# Patient Record
Sex: FEMALE | Race: WHITE | NOT HISPANIC OR LATINO
[De-identification: names, ages, dates, MRNs, and addresses within clinical notes are randomized per-mention and may not be internally consistent; named-entity substitution may affect disease eponyms.]

---

## 2022-11-10 ENCOUNTER — NON-APPOINTMENT (OUTPATIENT)
Age: 62
End: 2022-11-10

## 2022-11-10 ENCOUNTER — APPOINTMENT (OUTPATIENT)
Dept: HEART AND VASCULAR | Facility: CLINIC | Age: 62
End: 2022-11-10

## 2022-11-10 DIAGNOSIS — Z80.49 FAMILY HISTORY OF MALIGNANT NEOPLASM OF OTHER GENITAL ORGANS: ICD-10-CM

## 2022-11-10 DIAGNOSIS — R00.2 PALPITATIONS: ICD-10-CM

## 2022-11-10 DIAGNOSIS — Z87.891 PERSONAL HISTORY OF NICOTINE DEPENDENCE: ICD-10-CM

## 2022-11-10 DIAGNOSIS — Z82.49 FAMILY HISTORY OF ISCHEMIC HEART DISEASE AND OTHER DISEASES OF THE CIRCULATORY SYSTEM: ICD-10-CM

## 2022-11-10 DIAGNOSIS — K20.0 EOSINOPHILIC ESOPHAGITIS: ICD-10-CM

## 2022-11-10 DIAGNOSIS — Z80.0 FAMILY HISTORY OF MALIGNANT NEOPLASM OF DIGESTIVE ORGANS: ICD-10-CM

## 2022-11-10 DIAGNOSIS — Z86.69 PERSONAL HISTORY OF OTHER DISEASES OF THE NERVOUS SYSTEM AND SENSE ORGANS: ICD-10-CM

## 2022-11-10 DIAGNOSIS — D56.3 THALASSEMIA MINOR: ICD-10-CM

## 2022-11-10 DIAGNOSIS — Z00.00 ENCOUNTER FOR GENERAL ADULT MEDICAL EXAMINATION W/OUT ABNORMAL FINDINGS: ICD-10-CM

## 2022-11-10 DIAGNOSIS — C81.90 HODGKIN LYMPHOMA, UNSPECIFIED, UNSPECIFIED SITE: ICD-10-CM

## 2022-11-10 DIAGNOSIS — R06.09 OTHER FORMS OF DYSPNEA: ICD-10-CM

## 2022-11-10 DIAGNOSIS — Z80.41 FAMILY HISTORY OF MALIGNANT NEOPLASM OF OVARY: ICD-10-CM

## 2022-11-10 DIAGNOSIS — Z78.9 OTHER SPECIFIED HEALTH STATUS: ICD-10-CM

## 2022-11-10 DIAGNOSIS — Z81.8 FAMILY HISTORY OF OTHER MENTAL AND BEHAVIORAL DISORDERS: ICD-10-CM

## 2022-11-10 DIAGNOSIS — K21.9 GASTRO-ESOPHAGEAL REFLUX DISEASE W/OUT ESOPHAGITIS: ICD-10-CM

## 2022-11-10 DIAGNOSIS — E66.3 OVERWEIGHT: ICD-10-CM

## 2022-11-10 PROCEDURE — 99205 OFFICE O/P NEW HI 60 MIN: CPT

## 2022-11-10 PROCEDURE — 93000 ELECTROCARDIOGRAM COMPLETE: CPT

## 2022-11-10 PROCEDURE — 99072 ADDL SUPL MATRL&STAF TM PHE: CPT

## 2022-11-12 ENCOUNTER — NON-APPOINTMENT (OUTPATIENT)
Age: 62
End: 2022-11-12

## 2022-11-12 PROBLEM — Z86.69 HISTORY OF MIGRAINE HEADACHES: Status: RESOLVED | Noted: 2022-11-10 | Resolved: 2022-11-12

## 2022-11-12 PROBLEM — R00.2 PALPITATIONS: Status: ACTIVE | Noted: 2022-11-12

## 2022-11-12 PROBLEM — D56.3 THALASSEMIA TRAIT: Status: RESOLVED | Noted: 2022-11-10 | Resolved: 2022-11-12

## 2022-11-12 PROBLEM — R06.09 DYSPNEA ON EXERTION: Status: ACTIVE | Noted: 2022-11-12

## 2022-11-12 LAB
ALBUMIN SERPL ELPH-MCNC: 4.8 G/DL
ALP BLD-CCNC: 93 U/L
ALT SERPL-CCNC: 22 U/L
ANION GAP SERPL CALC-SCNC: 12 MMOL/L
AST SERPL-CCNC: 27 U/L
BASOPHILS # BLD AUTO: 0.09 K/UL
BASOPHILS NFR BLD AUTO: 0.8 %
BILIRUB SERPL-MCNC: 0.4 MG/DL
BUN SERPL-MCNC: 10 MG/DL
CALCIUM SERPL-MCNC: 10.5 MG/DL
CHLORIDE SERPL-SCNC: 102 MMOL/L
CHOLEST SERPL-MCNC: 195 MG/DL
CO2 SERPL-SCNC: 28 MMOL/L
CREAT SERPL-MCNC: 0.79 MG/DL
CRP SERPL HS-MCNC: 2.91 MG/L
EGFR: 86 ML/MIN/1.73M2
EOSINOPHIL # BLD AUTO: 0.07 K/UL
EOSINOPHIL NFR BLD AUTO: 0.6 %
ESTIMATED AVERAGE GLUCOSE: 128 MG/DL
FERRITIN SERPL-MCNC: 130 NG/ML
GLUCOSE SERPL-MCNC: 84 MG/DL
HBA1C MFR BLD HPLC: 6.1 %
HCT VFR BLD CALC: 47.9 %
HDLC SERPL-MCNC: 50 MG/DL
HGB BLD-MCNC: 14.1 G/DL
IMM GRANULOCYTES NFR BLD AUTO: 0.3 %
IRON SATN MFR SERPL: 20 %
IRON SERPL-MCNC: 88 UG/DL
LDLC SERPL CALC-MCNC: 130 MG/DL
LYMPHOCYTES # BLD AUTO: 3.79 K/UL
LYMPHOCYTES NFR BLD AUTO: 32.3 %
MAN DIFF?: NORMAL
MCHC RBC-ENTMCNC: 21.2 PG
MCHC RBC-ENTMCNC: 29.4 GM/DL
MCV RBC AUTO: 72 FL
MONOCYTES # BLD AUTO: 0.83 K/UL
MONOCYTES NFR BLD AUTO: 7.1 %
NEUTROPHILS # BLD AUTO: 6.9 K/UL
NEUTROPHILS NFR BLD AUTO: 58.9 %
NONHDLC SERPL-MCNC: 146 MG/DL
PLATELET # BLD AUTO: 361 K/UL
POTASSIUM SERPL-SCNC: 4.8 MMOL/L
PROT SERPL-MCNC: 8 G/DL
RBC # BLD: 6.65 M/UL
RBC # FLD: 18.4 %
SODIUM SERPL-SCNC: 142 MMOL/L
T3 SERPL-MCNC: 85 NG/DL
T3FREE SERPL-MCNC: 2.29 PG/ML
T4 SERPL-MCNC: 6.6 UG/DL
TIBC SERPL-MCNC: 445 UG/DL
TRIGL SERPL-MCNC: 77 MG/DL
TSH SERPL-ACNC: 3.39 UIU/ML
UIBC SERPL-MCNC: 357 UG/DL
WBC # FLD AUTO: 11.72 K/UL

## 2022-11-12 RX ORDER — UBIDECARENONE 200 MG
200 CAPSULE ORAL
Refills: 0 | Status: ACTIVE | COMMUNITY

## 2022-11-12 RX ORDER — FAMOTIDINE 40 MG/1
40 TABLET, FILM COATED ORAL
Refills: 0 | Status: ACTIVE | COMMUNITY

## 2022-11-12 RX ORDER — IBUPROFEN 200 MG
600 CAPSULE ORAL
Refills: 0 | Status: ACTIVE | COMMUNITY

## 2022-11-12 RX ORDER — MULTIVIT-MIN/IRON/FOLIC ACID/K 18-600-40
CAPSULE ORAL
Refills: 0 | Status: ACTIVE | COMMUNITY

## 2022-11-12 NOTE — HISTORY OF PRESENT ILLNESS
[FreeTextEntry1] : Micaela Walden is a 61 yo female who presents for CV evaluation.  She has been followed by Dr Gerardo De Jesus in the past.\par \par She denies cp, sob at rest, pnd, orthopnea, edema, or loc.  She has occ fleeting palps.  She has experienced positional lightheadedness in the past.  She noted decreased endurance.\par \par She is active.  She is compliant with meds.\par \par ECG today reveals NSR.\par \par Clinical hx reviewed in detail.\par \par Recommendations:\par 1. exercise\par 2. continue current meds\par 3. blood work - should consider lipid lowering strategies\par 4. EXSE\par 5. CPET\par 6. carotid duplex

## 2022-11-12 NOTE — DISCUSSION/SUMMARY
[Coronary Artery Disease] : coronary artery disease [Weight Reduction] : weight reduction [Hyperlipidemia] : hyperlipidemia [Diet Modification] : diet modification [Exercise] : exercise [Hypertension] : hypertension [Exercise Regimen] : an exercise regimen [Dietary Modification] : dietary modification [Weight Loss] : weight loss [Stable] : stable [None] : none [Low Sodium Diet] : low sodium diet [Patient] : the patient [de-identified] : dec endurance; MARTINEZ [EKG obtained to assist in diagnosis and management of assessed problem(s)] : EKG obtained to assist in diagnosis and management of assessed problem(s)

## 2022-11-12 NOTE — REASON FOR VISIT
[Symptom and Test Evaluation] : symptom and test evaluation [Hyperlipidemia] : hyperlipidemia [Hypertension] : hypertension [Coronary Artery Disease] : coronary artery disease [FreeTextEntry3] : Nellie Britton

## 2022-11-16 ENCOUNTER — APPOINTMENT (OUTPATIENT)
Dept: HEART AND VASCULAR | Facility: CLINIC | Age: 62
End: 2022-11-16

## 2022-11-16 PROCEDURE — 99072 ADDL SUPL MATRL&STAF TM PHE: CPT

## 2022-11-16 PROCEDURE — 94010 BREATHING CAPACITY TEST: CPT | Mod: 59

## 2022-11-16 PROCEDURE — 94621 CARDIOPULM EXERCISE TESTING: CPT

## 2022-11-17 ENCOUNTER — TRANSCRIPTION ENCOUNTER (OUTPATIENT)
Age: 62
End: 2022-11-17

## 2022-11-17 ENCOUNTER — NON-APPOINTMENT (OUTPATIENT)
Age: 62
End: 2022-11-17

## 2022-11-25 ENCOUNTER — NON-APPOINTMENT (OUTPATIENT)
Age: 62
End: 2022-11-25

## 2022-12-14 ENCOUNTER — APPOINTMENT (OUTPATIENT)
Dept: HEART AND VASCULAR | Facility: CLINIC | Age: 62
End: 2022-12-14
Payer: COMMERCIAL

## 2022-12-14 VITALS
OXYGEN SATURATION: 97 % | HEIGHT: 63 IN | BODY MASS INDEX: 26.58 KG/M2 | SYSTOLIC BLOOD PRESSURE: 126 MMHG | HEART RATE: 69 BPM | WEIGHT: 150 LBS | DIASTOLIC BLOOD PRESSURE: 60 MMHG

## 2022-12-14 PROCEDURE — 93015 CV STRESS TEST SUPVJ I&R: CPT

## 2022-12-14 PROCEDURE — 99072 ADDL SUPL MATRL&STAF TM PHE: CPT

## 2022-12-14 PROCEDURE — 93306 TTE W/DOPPLER COMPLETE: CPT

## 2022-12-14 PROCEDURE — 93880 EXTRACRANIAL BILAT STUDY: CPT

## 2022-12-16 ENCOUNTER — NON-APPOINTMENT (OUTPATIENT)
Age: 62
End: 2022-12-16

## 2022-12-16 DIAGNOSIS — Z88.8 ALLERGY STATUS TO OTHER DRUGS, MEDICAMENTS AND BIOLOGICAL SUBSTANCES: ICD-10-CM

## 2022-12-21 ENCOUNTER — NON-APPOINTMENT (OUTPATIENT)
Age: 62
End: 2022-12-21

## 2023-01-11 ENCOUNTER — RESULT REVIEW (OUTPATIENT)
Age: 63
End: 2023-01-11

## 2023-01-24 ENCOUNTER — RESULT REVIEW (OUTPATIENT)
Age: 63
End: 2023-01-24

## 2023-01-24 ENCOUNTER — NON-APPOINTMENT (OUTPATIENT)
Age: 63
End: 2023-01-24

## 2023-01-26 ENCOUNTER — APPOINTMENT (OUTPATIENT)
Dept: HEART AND VASCULAR | Facility: CLINIC | Age: 63
End: 2023-01-26

## 2023-01-30 ENCOUNTER — NON-APPOINTMENT (OUTPATIENT)
Age: 63
End: 2023-01-30

## 2023-03-29 ENCOUNTER — APPOINTMENT (OUTPATIENT)
Dept: HEART AND VASCULAR | Facility: CLINIC | Age: 63
End: 2023-03-29
Payer: SELF-PAY

## 2023-03-29 DIAGNOSIS — I25.5 ISCHEMIC CARDIOMYOPATHY: ICD-10-CM

## 2023-03-29 DIAGNOSIS — R94.39 ABNORMAL RESULT OF OTHER CARDIOVASCULAR FUNCTION STUDY: ICD-10-CM

## 2023-03-29 PROCEDURE — 99215 OFFICE O/P EST HI 40 MIN: CPT | Mod: 95

## 2023-03-29 RX ORDER — METHYLPREDNISOLONE 32 MG/1
32 TABLET ORAL
Qty: 2 | Refills: 1 | Status: DISCONTINUED | COMMUNITY
Start: 2022-12-16 | End: 2023-03-29

## 2023-03-29 RX ORDER — METOPROLOL SUCCINATE 50 MG/1
50 TABLET, EXTENDED RELEASE ORAL
Qty: 2 | Refills: 0 | Status: DISCONTINUED | COMMUNITY
Start: 2022-12-16 | End: 2023-03-29

## 2023-03-31 PROBLEM — I25.5 ISCHEMIC MYOCARDIAL DYSFUNCTION: Status: ACTIVE | Noted: 2022-12-16

## 2023-03-31 PROBLEM — R94.39 ABNORMAL STRESS ECG: Status: ACTIVE | Noted: 2022-12-16

## 2023-03-31 RX ORDER — ASPIRIN 81 MG
81 TABLET, DELAYED RELEASE (ENTERIC COATED) ORAL
Refills: 0 | Status: ACTIVE | COMMUNITY

## 2023-03-31 RX ORDER — LISINOPRIL 2.5 MG/1
2.5 TABLET ORAL
Refills: 0 | Status: ACTIVE | COMMUNITY

## 2023-03-31 NOTE — HISTORY OF PRESENT ILLNESS
[FreeTextEntry1] : Micaela Walden requests televideo follow up.  Consent obtained and recorded.  She is at her home and doctor is in [place], [place].  \par \par She has been followed by Dr Gerardo De Jesus in the past.\par \par She denies cp, sob at rest, pnd, orthopnea, edema, palp, or loc.  \par \par She is active.  She is compliant with meds.\par \par CPET 11/2022: ischemic myocardial dysfxn; .91% predicted peak VO2\par Carotid Duplex 12/2022: min disease MORENO and mild disease LICA\par EXSE 12/2022: nl lv sys fxn; nl wallace fxn; mild AI; 9:20 min Conrado; pos ECG; no ischemia by WM\par CTA 1/2023: min LAD (0.88 CTFFR), nl small CX (0.89 CTFFR); mild to mod mixed RCA (0.94 CT FFR)\par \par Clinical hx and results reviewed in detail.\par \par PE from 11/2022 eval.\par \par Recommendations:\par 1. exercise\par 2. start aspirin 81 mg daily; change pravastatin 40 mg daily to rosuvastatin 20 mg daily; t/c additional lipid lowering therapies if not at goal on rosuvastatin; continue lisinopril 2.5 mg daily\par 3. Mediterranean diet\par 4. f/u in 3 months for blood work and review

## 2023-03-31 NOTE — DISCUSSION/SUMMARY
[Coronary Artery Disease] : coronary artery disease [Weight Reduction] : weight reduction [Hyperlipidemia] : hyperlipidemia [Not Responding to Treatment] : not responding to treatment [Medication Changes Per Orders] : Medication changes are as documented in orders [Diet Modification] : diet modification [Exercise] : exercise [Hypertension] : hypertension [Exercise Regimen] : an exercise regimen [Dietary Modification] : dietary modification [Weight Loss] : weight loss [Low Sodium Diet] : low sodium diet [Stable] : stable [None] : There are no changes in medication management [Exercise Rehab] : exercise rehabilitation [Patient] : the patient [de-identified] : mild to mod mixed RCA lesion (0.94 CT FFR), min LAD lesion (0.88 CT FFR), and nl small CX (0.89 CT FRR)  by CTA 1/2023; abnl stress ECG 12/2022; ischemic myocardial dysfxn 11/2022 [de-identified] : min MORENO and mild LICA disease b/l 12/2022 [FreeTextEntry1] : AI - mild

## 2023-03-31 NOTE — REASON FOR VISIT
[Symptom and Test Evaluation] : symptom and test evaluation [Structural Heart and Valve Disease] : structural heart and valve disease [Hyperlipidemia] : hyperlipidemia [Hypertension] : hypertension [Coronary Artery Disease] : coronary artery disease [Carotid, Aortic and Peripheral Vascular Disease] : carotid, aortic and peripheral vascular disease [FreeTextEntry3] : Nellie Britton

## 2023-08-01 ENCOUNTER — APPOINTMENT (OUTPATIENT)
Dept: HEART AND VASCULAR | Facility: CLINIC | Age: 63
End: 2023-08-01
Payer: SELF-PAY

## 2023-08-01 DIAGNOSIS — I65.29 OCCLUSION AND STENOSIS OF UNSPECIFIED CAROTID ARTERY: ICD-10-CM

## 2023-08-01 DIAGNOSIS — I35.1 NONRHEUMATIC AORTIC (VALVE) INSUFFICIENCY: ICD-10-CM

## 2023-08-01 DIAGNOSIS — R73.03 PREDIABETES.: ICD-10-CM

## 2023-08-01 DIAGNOSIS — I10 ESSENTIAL (PRIMARY) HYPERTENSION: ICD-10-CM

## 2023-08-01 PROCEDURE — 99215 OFFICE O/P EST HI 40 MIN: CPT | Mod: 95

## 2023-08-01 NOTE — REASON FOR VISIT
[Structural Heart and Valve Disease] : structural heart and valve disease [Hyperlipidemia] : hyperlipidemia [Hypertension] : hypertension [Coronary Artery Disease] : coronary artery disease [Carotid, Aortic and Peripheral Vascular Disease] : carotid, aortic and peripheral vascular disease [FreeTextEntry3] : Nellie Britton

## 2023-08-01 NOTE — DISCUSSION/SUMMARY
[Coronary Artery Disease] : coronary artery disease [Weight Reduction] : weight reduction [Hyperlipidemia] : hyperlipidemia [Not Responding to Treatment] : not responding to treatment [Diet Modification] : diet modification [Exercise] : exercise [Hypertension] : hypertension [Exercise Regimen] : an exercise regimen [Dietary Modification] : dietary modification [Weight Loss] : weight loss [Low Sodium Diet] : low sodium diet [Stable] : stable [None] : There are no changes in medication management [Exercise Rehab] : exercise rehabilitation [Patient] : the patient [de-identified] : mild to mod mixed RCA lesion (0.94 CT FFR), min LAD lesion (0.88 CT FFR), and nl small CX (0.89 CT FRR)  by CTA 1/2023; abnl stress ECG 12/2022; ischemic myocardial dysfxn 11/2022 [de-identified] : min MORENO and mild LICA disease b/l 12/2022 [FreeTextEntry1] : AI - mild

## 2023-08-01 NOTE — REVIEW OF SYSTEMS
[Joint Pain] : no joint pain [Joint Swelling] : no joint swelling [Joint Stiffness] : no joint stiffness [Muscle Cramps] : muscle cramps [Myalgia] : no myalgia [Negative] : Heme/Lymph

## 2023-08-14 ENCOUNTER — APPOINTMENT (OUTPATIENT)
Dept: BREAST CENTER | Facility: CLINIC | Age: 63
End: 2023-08-14
Payer: COMMERCIAL

## 2023-08-14 ENCOUNTER — NON-APPOINTMENT (OUTPATIENT)
Age: 63
End: 2023-08-14

## 2023-08-14 VITALS
BODY MASS INDEX: 28.34 KG/M2 | HEART RATE: 86 BPM | OXYGEN SATURATION: 99 % | DIASTOLIC BLOOD PRESSURE: 69 MMHG | HEIGHT: 62 IN | SYSTOLIC BLOOD PRESSURE: 127 MMHG | WEIGHT: 154 LBS

## 2023-08-14 DIAGNOSIS — Z92.3 PERSONAL HISTORY OF IRRADIATION: ICD-10-CM

## 2023-08-14 DIAGNOSIS — Z85.71 PERSONAL HISTORY OF HODGKIN LYMPHOMA: ICD-10-CM

## 2023-08-14 DIAGNOSIS — R92.8 OTHER ABNORMAL AND INCONCLUSIVE FINDINGS ON DIAGNOSTIC IMAGING OF BREAST: ICD-10-CM

## 2023-08-14 PROCEDURE — 99203 OFFICE O/P NEW LOW 30 MIN: CPT

## 2023-08-14 NOTE — HISTORY OF PRESENT ILLNESS
[FreeTextEntry1] : The patient is a 62-year-old  postmenopausal white female of [place] descent.  She underwent menarche at age 15 and had her first child at age 37.  She never took any hormone replacement therapy after menopause.  She quit smoking greater than 30 years ago.  She has no family history of breast or ovarian cancer but she has a maternal grandmother who had colon cancer at age 90 and a paternal cousin had colon cancer at age 67.  A maternal aunt had uterine cancer at age 75.  The patient herself was diagnosed with Hodgkin's lymphoma after a left cervical lymph node biopsy in .  She underwent mantle radiation and has been followed by Dr. Hung.  She also has a history of some hypertension and hypercholesterolemia.  She has been getting routine mammography and ultrasound but has not yet been getting screening yearly MRIs.  She underwent her last mammography and ultrasound on 2023 which showed a new nodule in the left breast 12:00 region on mammography which corresponded to a 2.5 cm cystic density on ultrasound.  She comes in now for a surgical evaluation.

## 2023-08-14 NOTE — PHYSICAL EXAM
[Normocephalic] : normocephalic [Atraumatic] : atraumatic [EOMI] : extra ocular movement intact [Supple] : supple [No Supraclavicular Adenopathy] : no supraclavicular adenopathy [No Cervical Adenopathy] : no cervical adenopathy [Examined in the supine and seated position] : examined in the supine and seated position [No dominant masses] : no dominant masses in right breast  [No dominant masses] : no dominant masses left breast [No Nipple Retraction] : no left nipple retraction [No Nipple Discharge] : no left nipple discharge [Breast Mass Right Breast ___cm] : no masses [Breast Mass Left Breast ___cm] : no masses [Breast Nipple Inversion] : nipples not inverted [Breast Nipple Retraction] : nipples not retracted [Breast Nipple Flattening] : nipples not flattened [Breast Nipple Fissures] : nipples not fissured [Breast Abnormal Lactation (Galactorrhea)] : no galactorrhea [Breast Abnormal Secretion Bloody Fluid] : no bloody discharge [Breast Abnormal Secretion Serous Fluid] : no serous discharge [Breast Abnormal Secretion Opalescent Fluid] : no milky discharge [No Axillary Lymphadenopathy] : no left axillary lymphadenopathy [No Edema] : no edema [No Rashes] : no rashes [No Ulceration] : no ulceration [de-identified] : On exam, the patient has moderately ptotic C-cup breasts.  On palpation, I cannot feel any suspicious densities in either breast even with close attention to the left breast 12:00 region.  She has no axillary, supraclavicular, or cervical adenopathy.

## 2023-08-14 NOTE — ASSESSMENT
[FreeTextEntry1] : The patient is a 62-year-old  postmenopausal white female of [place] descent.  She underwent menarche at age 15 and had her first child at age 37.  She never took any hormone replacement therapy after menopause.  She quit smoking greater than 30 years ago.  She has no family history of breast or ovarian cancer but she has a maternal grandmother who had colon cancer at age 90 and a paternal cousin had colon cancer at age 67.  A maternal aunt had uterine cancer at age 75.  The patient herself was diagnosed with Hodgkin's lymphoma after a left cervical lymph node biopsy in .  She underwent mantle radiation and has been followed by Dr. Hung.  She also has a history of some hypertension and hypercholesterolemia.  She has been getting routine mammography and ultrasound but has not yet been getting screening yearly MRIs.  She underwent her last mammography and ultrasound on 2023 which showed a new nodule in the left breast 12:00 region on mammography which corresponded to a 2.5 cm cystic density on ultrasound.  I reviewed her imaging on the PACS system and indeed she has a small cystic density seen in the left breast 12:00 region which appears benign and I agree with a follow-up diagnostic mammography and ultrasound in 6 months.  I spoke to the patient at length regarding her Hodgkin's lymphoma and her increased risk of breast cancer due to her mantle radiation.  She has not been getting screening MRIs and I would like her to get one now and she was given a prescription and will get preapproval and have this done here at [facility].  As long as that is negative she should follow up with me in 6 months and should get her follow-up diagnostic left breast mammography and ultrasound in 2023.  She will then need routine yearly mammography and ultrasound as well as MRI staggered every 6 months.  She understands her increased risk of breast cancer and the need for this close surveillance and agrees to proceed as planned.  She will follow-up with Dr. Hung as well for routine follow-up given her history of the Hodgkin's lymphoma.

## 2023-08-14 NOTE — REASON FOR VISIT
[Initial Evaluation] : an initial evaluation [FreeTextEntry1] : The patient is a postmenopausal white female of [place] descent with a family history of colon cancer and a personal history of Hodgkin's lymphoma diagnosed in 1993 for which she underwent mantle radiation and has been followed by Dr. Hung.  She comes in now to establish breast screening and surveillance with a new left breast 12:00 cystic nodule which was seen on mammography and ultrasound in June 2023.

## 2023-08-25 DIAGNOSIS — E78.5 HYPERLIPIDEMIA, UNSPECIFIED: ICD-10-CM

## 2023-08-25 DIAGNOSIS — I25.10 ATHEROSCLEROTIC HEART DISEASE OF NATIVE CORONARY ARTERY W/OUT ANGINA PECTORIS: ICD-10-CM

## 2023-08-31 ENCOUNTER — APPOINTMENT (OUTPATIENT)
Dept: HEART AND VASCULAR | Facility: CLINIC | Age: 63
End: 2023-08-31

## 2023-10-30 ENCOUNTER — RESULT REVIEW (OUTPATIENT)
Age: 63
End: 2023-10-30

## 2023-11-02 ENCOUNTER — NON-APPOINTMENT (OUTPATIENT)
Age: 63
End: 2023-11-02

## 2023-11-19 ENCOUNTER — RESULT REVIEW (OUTPATIENT)
Age: 63
End: 2023-11-19

## 2023-11-22 ENCOUNTER — NON-APPOINTMENT (OUTPATIENT)
Age: 63
End: 2023-11-22

## 2024-01-09 ENCOUNTER — APPOINTMENT (OUTPATIENT)
Dept: HEART AND VASCULAR | Facility: CLINIC | Age: 64
End: 2024-01-09
Payer: COMMERCIAL

## 2024-01-09 VITALS
HEIGHT: 62 IN | BODY MASS INDEX: 29.26 KG/M2 | WEIGHT: 159 LBS | OXYGEN SATURATION: 97 % | DIASTOLIC BLOOD PRESSURE: 58 MMHG | HEART RATE: 76 BPM | SYSTOLIC BLOOD PRESSURE: 102 MMHG

## 2024-01-09 PROCEDURE — 93306 TTE W/DOPPLER COMPLETE: CPT

## 2024-01-09 PROCEDURE — 93015 CV STRESS TEST SUPVJ I&R: CPT

## 2024-01-11 ENCOUNTER — NON-APPOINTMENT (OUTPATIENT)
Age: 64
End: 2024-01-11

## 2024-01-29 DIAGNOSIS — Z12.39 ENCOUNTER FOR OTHER SCREENING FOR MALIGNANT NEOPLASM OF BREAST: ICD-10-CM

## 2024-02-14 ENCOUNTER — APPOINTMENT (OUTPATIENT)
Dept: HEART AND VASCULAR | Facility: CLINIC | Age: 64
End: 2024-02-14

## 2024-02-21 ENCOUNTER — APPOINTMENT (OUTPATIENT)
Dept: HEART AND VASCULAR | Facility: CLINIC | Age: 64
End: 2024-02-21

## 2024-02-21 NOTE — HISTORY OF PRESENT ILLNESS
[FreeTextEntry1] :  I, Gino Foster, acted solely as a scribe for Dr. Bonner on this date on 02/21/2024. [FreeTextEntry1] : Micaela Walden requests televideo follow up.  Consent obtained and recorded.  She is at her home and doctor is in [place], [place].    She has been followed by Dr Gerardo De Jesus in the past.  She denies cp, sob at rest, pnd, orthopnea, edema, palp, or loc.    She is active.  She is compliant with meds.  CPET 11/2022: ischemic myocardial dysfxn; .91% predicted peak VO2 Carotid Duplex 12/2022: min disease MORENO and mild disease LICA EXSE 12/2022: nl lv sys fxn; nl wallace fxn; mild AI; 9:20 min Conrado; pos ECG; no ischemia by WM CTA 1/2023: min LAD (0.88 CTFFR), nl small CX (0.89 CTFFR); mild to mod mixed RCA (0.94 CT FFR)  Clinical hx reviewed in detail.  PE from 11/2022 eval.  Recommendations: 1. exercise 2. inc po hydration to address cramps; t/c other interventions prior to consideration of statin intolerance 2. resume aspirin 81 mg daily; continue rosuvastatin 20 mg daily; t/c additional or alternate lipid lowering therapy if not at goal at next blood work; continue lisinopril 2.5 mg daily 3. Mediterranean diet 4. f/u in 2-3 months for update on cramping 5. EXSE/CPET 1/2024

## 2024-03-18 ENCOUNTER — RX RENEWAL (OUTPATIENT)
Age: 64
End: 2024-03-18

## 2024-03-18 RX ORDER — ROSUVASTATIN CALCIUM 20 MG/1
20 TABLET, FILM COATED ORAL
Qty: 90 | Refills: 3 | Status: ACTIVE | COMMUNITY
Start: 2023-03-31 | End: 1900-01-01

## 2024-03-19 ENCOUNTER — APPOINTMENT (OUTPATIENT)
Dept: HEART AND VASCULAR | Facility: CLINIC | Age: 64
End: 2024-03-19

## 2024-05-02 ENCOUNTER — NON-APPOINTMENT (OUTPATIENT)
Age: 64
End: 2024-05-02

## 2024-05-02 ENCOUNTER — LABORATORY RESULT (OUTPATIENT)
Age: 64
End: 2024-05-02

## 2024-05-02 ENCOUNTER — APPOINTMENT (OUTPATIENT)
Dept: HEART AND VASCULAR | Facility: CLINIC | Age: 64
End: 2024-05-02
Payer: COMMERCIAL

## 2024-05-02 VITALS
HEIGHT: 62 IN | OXYGEN SATURATION: 97 % | BODY MASS INDEX: 30.18 KG/M2 | SYSTOLIC BLOOD PRESSURE: 120 MMHG | DIASTOLIC BLOOD PRESSURE: 70 MMHG | WEIGHT: 164 LBS | HEART RATE: 87 BPM | TEMPERATURE: 98.7 F

## 2024-05-02 PROCEDURE — 93000 ELECTROCARDIOGRAM COMPLETE: CPT

## 2024-05-02 PROCEDURE — 99214 OFFICE O/P EST MOD 30 MIN: CPT | Mod: 25

## 2024-05-02 RX ORDER — METHYLPREDNISOLONE 32 MG/1
32 TABLET ORAL
Qty: 2 | Refills: 0 | Status: DISCONTINUED | COMMUNITY
Start: 2023-08-14 | End: 2024-05-02

## 2024-05-07 ENCOUNTER — NON-APPOINTMENT (OUTPATIENT)
Age: 64
End: 2024-05-07

## 2024-05-07 LAB
ALBUMIN SERPL ELPH-MCNC: 4.7 G/DL
ALP BLD-CCNC: 82 U/L
ALT SERPL-CCNC: 26 U/L
ANION GAP SERPL CALC-SCNC: 16 MMOL/L
APO LP(A) SERPL-MCNC: 53 NMOL/L
AST SERPL-CCNC: 27 U/L
BASOPHILS # BLD AUTO: 0.14 K/UL
BASOPHILS NFR BLD AUTO: 1.3 %
BILIRUB SERPL-MCNC: 0.5 MG/DL
BUN SERPL-MCNC: 17 MG/DL
CALCIUM SERPL-MCNC: 9.7 MG/DL
CHLORIDE SERPL-SCNC: 103 MMOL/L
CHOLEST SERPL-MCNC: 157 MG/DL
CK SERPL-CCNC: 88 U/L
CO2 SERPL-SCNC: 22 MMOL/L
CREAT SERPL-MCNC: 0.96 MG/DL
CRP SERPL HS-MCNC: 2.15 MG/L
EGFR: 67 ML/MIN/1.73M2
EOSINOPHIL # BLD AUTO: 0.1 K/UL
EOSINOPHIL NFR BLD AUTO: 0.9 %
FERRITIN SERPL-MCNC: 115 NG/ML
FOLATE SERPL-MCNC: 14.4 NG/ML
GLUCOSE SERPL-MCNC: 83 MG/DL
HCT VFR BLD CALC: 47.2 %
HDLC SERPL-MCNC: 55 MG/DL
HGB BLD-MCNC: 14.4 G/DL
IMM GRANULOCYTES NFR BLD AUTO: 0.3 %
IRON SATN MFR SERPL: 25 %
IRON SERPL-MCNC: 105 UG/DL
LDLC SERPL CALC-MCNC: 83 MG/DL
LYMPHOCYTES # BLD AUTO: 3.43 K/UL
LYMPHOCYTES NFR BLD AUTO: 32.2 %
MAN DIFF?: NORMAL
MCHC RBC-ENTMCNC: 21.4 PG
MCHC RBC-ENTMCNC: 30.5 GM/DL
MCV RBC AUTO: 70 FL
MONOCYTES # BLD AUTO: 0.84 K/UL
MONOCYTES NFR BLD AUTO: 7.9 %
NEUTROPHILS # BLD AUTO: 6.12 K/UL
NEUTROPHILS NFR BLD AUTO: 57.4 %
NONHDLC SERPL-MCNC: 102 MG/DL
PLATELET # BLD AUTO: 317 K/UL
POTASSIUM SERPL-SCNC: 5.2 MMOL/L
PROT SERPL-MCNC: 7.3 G/DL
RBC # BLD: 6.74 M/UL
RBC # FLD: 19.5 %
SODIUM SERPL-SCNC: 140 MMOL/L
T3FREE SERPL-MCNC: 3.01 PG/ML
T3RU NFR SERPL: 1.2 TBI
T4 SERPL-MCNC: 7 UG/DL
TIBC SERPL-MCNC: 425 UG/DL
TRIGL SERPL-MCNC: 104 MG/DL
TSH SERPL-ACNC: 3.58 UIU/ML
UIBC SERPL-MCNC: 320 UG/DL
VIT B12 SERPL-MCNC: 464 PG/ML
WBC # FLD AUTO: 10.66 K/UL

## 2024-05-19 NOTE — REASON FOR VISIT
[Structural Heart and Valve Disease] : structural heart and valve disease [Hyperlipidemia] : hyperlipidemia [Hypertension] : hypertension [Coronary Artery Disease] : coronary artery disease [Carotid, Aortic and Peripheral Vascular Disease] : carotid, aortic and peripheral vascular disease [FreeTextEntry3] : Nellie Britton [FreeTextEntry1] : Follow up cardiac care

## 2024-05-19 NOTE — HISTORY OF PRESENT ILLNESS
[FreeTextEntry1] : 64 yo female with h/o thalassemia trait, CAD (non-obs on CTA), HTN, HLD, Hodgkin's lymphoma, mantle cell radiation, here for follow up cardiac care. She has been followed by Dr Gerardo De Jesus and Dr. John Cali in the past. Last seen in 08/2023 Denies any significant chest discomfort, palpitations, dyspnea, LE edema, PND or syncope. Intermittent lightheadedness/dizziness when she suddenty stands up - not new however. She denies cp, sob at rest, pnd, orthopnea, edema, palp, or loc.   She is active.  She is compliant with meds. +FMH of ASCVD - father - may have had a heart attack  Cardiac Workup: CPET 11/2022: ischemic myocardial dysfxn; .91% predicted peak VO2 Carotid Duplex 12/2022: min disease MORENO and mild disease LICA EXSE 12/2022: nl lv sys fxn; nl wallace fxn; mild AI; 9:20 min Conrado; pos ECG; no ischemia by WM CTA 1/2023: min LAD (0.88 CTFFR), nl small CX (0.89 CTFFR); mild to mod mixed RCA (0.94 CT FFR) TTE 1/2024: normal LV size/function. LVEF 67%, normal RV size/function, normal aorta size. fibrocalcific AV, no AS, Mild AI, [place] EST 1/2024: ECG is positive for ischemia. 1 mm horizontal ST depressions noted in II, III, aVF, V4-V6. Ex time: 8 min 44 sec (10.3 METS), 101% MPHR achieved.

## 2024-05-19 NOTE — REVIEW OF SYSTEMS
[Muscle Cramps] : muscle cramps [Negative] : Heme/Lymph [Joint Pain] : no joint pain [Joint Swelling] : no joint swelling [Joint Stiffness] : no joint stiffness [Myalgia] : no myalgia [FreeTextEntry5] : as per HPI

## 2024-05-19 NOTE — DISCUSSION/SUMMARY
[Coronary Artery Disease] : coronary artery disease [Weight Reduction] : weight reduction [Hyperlipidemia] : hyperlipidemia [Not Responding to Treatment] : not responding to treatment [Diet Modification] : diet modification [Exercise] : exercise [Hypertension] : hypertension [Exercise Regimen] : an exercise regimen [Dietary Modification] : dietary modification [Weight Loss] : weight loss [Low Sodium Diet] : low sodium diet [Stable] : stable [None] : There are no changes in medication management [Exercise Rehab] : exercise rehabilitation [Patient] : the patient [de-identified] : mild to mod mixed RCA lesion (0.94 CT FFR), min LAD lesion (0.88 CT FFR), and nl small CX (0.89 CT FRR)  by CTA 1/2023; abnl stress ECG 12/2022; ischemic myocardial dysfxn 11/2022 [de-identified] : min MORENO and mild LICA disease b/l 12/2022 [FreeTextEntry1] : AI - mild

## 2024-05-19 NOTE — ASSESSMENT
[FreeTextEntry1] : 64 yo female with h/o thalassemia trait, CAD (non-obs on CTA), HTN, HLD, Hodgkin's lymphoma, mantle cell radiation, here for follow up cardiac care. #ASCVD - mild CAD on CCTA, non-obstructive - continue ASA - continue crestor - continued risk factor modification; cardiovascular risk factors discussed - stress test - abnormal EKG response (also seen in 2022); will obtain stress test with imaging (stress echo vs. nuclear) if symptoms change   # Hypertension -  BP is WNL today - continue current anti-hypertensives - home BP monitoring encouraged - continued exercise as tolerated - Low salt diet  # Hyperlipidemia  - continue crestor; uptitrate dose to goal LDL <70 - Continue current prescribed medications - Low fat low cholesterol diet - Heart healthy diet emphasized - Exercise as tolerated

## 2024-06-03 ENCOUNTER — NON-APPOINTMENT (OUTPATIENT)
Age: 64
End: 2024-06-03

## 2024-06-19 NOTE — ASSESSMENT
[FreeTextEntry1] : The patient is a 63-year-old  postmenopausal white female of [place] descent.  She underwent menarche at age 15 and had her first child at age 37.  She never took any hormone replacement therapy after menopause.  She quit smoking greater than 30 years ago.  She has no family history of breast or ovarian cancer but she has a maternal grandmother who had colon cancer at age 90 and a paternal cousin had colon cancer at age 67.  A maternal aunt had uterine cancer at age 75.  The patient herself was diagnosed with Hodgkin's lymphoma after a left cervical lymph node biopsy in .  She underwent mantle radiation and has been followed by Dr. Hung.  She also has a history of some hypertension and hypercholesterolemia.  She has been getting routine mammography and ultrasound but had not yet been getting screening yearly MRIs. She underwent a bilateral mammography and ultrasound on 2023 which showed a new nodule in the left breast 12:00 region on mammography which corresponded to a 2.5 cm cystic density on ultrasound.  She then underwent a screening bilateral breast MRI at [facility] on 2023 showing some enhancement in the left breast 12:00 region 5 cm from the nipple with a 6 mm circumscribed density corresponding to the mammographic finding.  A tomosynthesis guided left breast 12:00 subareolar stereotactic core biopsy was performed on 2023 at [facility] and final pathology just showed benign conglomerate of apocrine cysts.  She underwent her last bilateral mammography and ultrasound which was reviewed from ?????? 2024 and performed at [facility] which showed ???????. On exam today, I cannot feel any suspicious densities in either breast.  The patient was reassured and should continue routine 6-month clinical exams.  Her next bilateral MRI will be due again in ??????? 2024 and she was given a prescription.  She should continue her yearly mammography and ultrasounds in ?????? 2025.  She will follow-up with Dr. Hung as well for routine follow-up given her history of the Hodgkin's lymphoma and mantle radiation.

## 2024-06-19 NOTE — HISTORY OF PRESENT ILLNESS
[FreeTextEntry1] : The patient is a 63-year-old  postmenopausal white female of [place] descent.  She underwent menarche at age 15 and had her first child at age 37.  She never took any hormone replacement therapy after menopause.  She quit smoking greater than 30 years ago.  She has no family history of breast or ovarian cancer but she has a maternal grandmother who had colon cancer at age 90 and a paternal cousin had colon cancer at age 67.  A maternal aunt had uterine cancer at age 75.  The patient herself was diagnosed with Hodgkin's lymphoma after a left cervical lymph node biopsy in .  She underwent mantle radiation and has been followed by Dr. Hung.  She also has a history of some hypertension and hypercholesterolemia.  She has been getting routine mammography and ultrasound but had not yet been getting screening yearly MRIs.  She underwent a bilateral mammography and ultrasound on 2023 which showed a new nodule in the left breast 12:00 region on mammography which corresponded to a 2.5 cm cystic density on ultrasound.  She then underwent a screening bilateral breast MRI at [facility] on 2023 showing some enhancement in the left breast 12:00 region 5 cm from the nipple with a 6 mm circumscribed density corresponding to the mammographic finding.  A tomosynthesis guided left breast 12:00 subareolar stereotactic core biopsy was performed on 2023 at [facility] and final pathology just showed benign conglomerate of apocrine cysts.  She comes in now to continue routine close breast cancer screening/surveillance due to her history of Hodgkin's lymphoma and mantle radiation.

## 2024-06-19 NOTE — REASON FOR VISIT
[Follow-Up: _____] : a [unfilled] follow-up visit [FreeTextEntry1] : The patient is a postmenopausal white female of [place] descent with a family history of colon cancer and a personal history of Hodgkin's lymphoma diagnosed in 1993 for which she underwent mantle radiation and has been followed by Dr. Hung.  She comes in now for routine high risk breast cancer screening and surveillance with a new left breast 12:00 cystic nodule which was seen on mammography and ultrasound in June 2023.  MRI performed in October 2023 did show some enhancement in the left breast 12:00 region 5 cm from the nipple correlating with the mammographic mass and tomosynthesis guided needle core biopsy was performed at [facility] on November 20, 2023 just showing benign apocrine cysts.  She comes in now for routine follow-up to get close breast cancer screening surveillance due to her history of Hodgkin's lymphoma and mantle radiation.

## 2024-06-19 NOTE — PHYSICAL EXAM
[Normocephalic] : normocephalic [Atraumatic] : atraumatic [EOMI] : extra ocular movement intact [Supple] : supple [No Supraclavicular Adenopathy] : no supraclavicular adenopathy [No Cervical Adenopathy] : no cervical adenopathy [Examined in the supine and seated position] : examined in the supine and seated position [No dominant masses] : no dominant masses in right breast  [No dominant masses] : no dominant masses left breast [No Nipple Retraction] : no left nipple retraction [No Nipple Discharge] : no left nipple discharge [Breast Mass Right Breast ___cm] : no masses [Breast Mass Left Breast ___cm] : no masses [No Axillary Lymphadenopathy] : no left axillary lymphadenopathy [No Edema] : no edema [No Rashes] : no rashes [No Ulceration] : no ulceration [Breast Nipple Inversion] : nipples not inverted [Breast Nipple Retraction] : nipples not retracted [Breast Nipple Flattening] : nipples not flattened [Breast Nipple Fissures] : nipples not fissured [Breast Abnormal Lactation (Galactorrhea)] : no galactorrhea [Breast Abnormal Secretion Bloody Fluid] : no bloody discharge [Breast Abnormal Secretion Serous Fluid] : no serous discharge [Breast Abnormal Secretion Opalescent Fluid] : no milky discharge [de-identified] : On exam, the patient has moderately ptotic C-cup breasts.  On palpation, I cannot feel any suspicious densities in either breast even with close attention to the left breast 12:00 region.  She has no axillary, supraclavicular, or cervical adenopathy.

## 2024-06-21 ENCOUNTER — NON-APPOINTMENT (OUTPATIENT)
Age: 64
End: 2024-06-21

## 2024-07-28 PROBLEM — Z85.71 HISTORY OF HODGKIN'S LYMPHOMA: Status: RESOLVED | Noted: 2023-08-14 | Resolved: 2024-07-28

## 2024-07-31 ENCOUNTER — RESULT REVIEW (OUTPATIENT)
Age: 64
End: 2024-07-31

## 2024-08-01 ENCOUNTER — APPOINTMENT (OUTPATIENT)
Dept: BREAST CENTER | Facility: CLINIC | Age: 64
End: 2024-08-01
Payer: COMMERCIAL

## 2024-08-01 VITALS
OXYGEN SATURATION: 100 % | HEART RATE: 75 BPM | WEIGHT: 160 LBS | HEIGHT: 62 IN | SYSTOLIC BLOOD PRESSURE: 136 MMHG | DIASTOLIC BLOOD PRESSURE: 73 MMHG | BODY MASS INDEX: 29.44 KG/M2

## 2024-08-01 DIAGNOSIS — Z92.3 PERSONAL HISTORY OF IRRADIATION: ICD-10-CM

## 2024-08-01 DIAGNOSIS — Z12.39 ENCOUNTER FOR OTHER SCREENING FOR MALIGNANT NEOPLASM OF BREAST: ICD-10-CM

## 2024-08-01 PROCEDURE — 99213 OFFICE O/P EST LOW 20 MIN: CPT

## 2024-08-01 NOTE — PHYSICAL EXAM
[Normocephalic] : normocephalic [Atraumatic] : atraumatic [EOMI] : extra ocular movement intact [Supple] : supple [No Supraclavicular Adenopathy] : no supraclavicular adenopathy [No Cervical Adenopathy] : no cervical adenopathy [Examined in the supine and seated position] : examined in the supine and seated position [No dominant masses] : no dominant masses in right breast  [No dominant masses] : no dominant masses left breast [No Nipple Retraction] : no left nipple retraction [No Nipple Discharge] : no left nipple discharge [Breast Mass Right Breast ___cm] : no masses [Breast Mass Left Breast ___cm] : no masses [No Axillary Lymphadenopathy] : no left axillary lymphadenopathy [No Edema] : no edema [No Rashes] : no rashes [No Ulceration] : no ulceration [Breast Nipple Inversion] : nipples not inverted [Breast Nipple Retraction] : nipples not retracted [Breast Nipple Flattening] : nipples not flattened [Breast Abnormal Lactation (Galactorrhea)] : no galactorrhea [Breast Nipple Fissures] : nipples not fissured [Breast Abnormal Secretion Bloody Fluid] : no bloody discharge [Breast Abnormal Secretion Serous Fluid] : no serous discharge [Breast Abnormal Secretion Opalescent Fluid] : no milky discharge [de-identified] : On exam, the patient has moderately ptotic C-cup breasts.  On palpation, I cannot feel any suspicious densities in either breast even with close attention to the left breast 12:00 region.  She has no axillary, supraclavicular, or cervical adenopathy.

## 2024-08-01 NOTE — ADDENDUM
[FreeTextEntry1] : I spent greater than 75% of consultation in face-to-face counseling and coordination care in this patient with a history of Hodgkin's lymphoma and mantle radiation who comes in for high risk breast cancer screening/surveillance.

## 2024-08-01 NOTE — HISTORY OF PRESENT ILLNESS
[FreeTextEntry1] : The patient is a 63-year-old  postmenopausal white female of [place] descent.  She underwent menarche at age 15 and had her first child at age 37.  She never took any hormone replacement therapy after menopause.  She quit smoking greater than 30 years ago.  She has no family history of breast or ovarian cancer but she has a maternal grandmother who had colon cancer at age 90 and a paternal cousin had colon cancer at age 67.  A maternal aunt had uterine cancer at age 75.  The patient herself was diagnosed with Hodgkin's lymphoma after a left cervical lymph node biopsy in .  She underwent mantle radiation and has been followed by Dr. Hung.  She also has a history of some hypertension and hypercholesterolemia.  She has been getting routine mammography and ultrasound but had not yet been getting screening yearly MRIs.  She underwent a bilateral mammography and ultrasound on 2023 which showed a new nodule in the left breast 12:00 region on mammography which corresponded to a 2.5 cm cystic density on ultrasound.  She then underwent a screening bilateral breast MRI at [facility] on 2023 showing some enhancement in the left breast 12:00 region 5 cm from the nipple with a 6 mm circumscribed density corresponding to the mammographic finding.  A tomosynthesis guided left breast 12:00 subareolar stereotactic core biopsy was performed on 2023 at [facility] and final pathology just showed a benign conglomerate of apocrine cysts.  She comes in now to continue routine close breast cancer screening/surveillance due to her history of Hodgkin's lymphoma and mantle radiation.

## 2024-08-01 NOTE — REASON FOR VISIT
[Follow-Up: _____] : a [unfilled] follow-up visit [FreeTextEntry1] : The patient is a postmenopausal white female of [place] descent with a family history of colon cancer and a personal history of Hodgkin's lymphoma diagnosed in 1993 for which she underwent mantle radiation and has been followed by Dr. Hung.  She comes in now for routine high risk breast cancer screening and surveillance with a new left breast 12:00 cystic nodule which was seen on mammography and ultrasound in June 2023.  MRI performed in October 2023 did show some enhancement in the left breast 12:00 region 5 cm from the nipple correlating with the mammographic mass and tomosynthesis guided needle core biopsy was performed at [facility] on November 20, 2023 just showing benign apocrine cysts.  She comes in now for routine follow-up to get close breast cancer screening surveillance due to her history of Hodgkin's lymphoma and mantle radiation.

## 2024-08-01 NOTE — ASSESSMENT
[FreeTextEntry1] : The patient is a 63-year-old  postmenopausal white female of [place] descent.  She underwent menarche at age 15 and had her first child at age 37.  She never took any hormone replacement therapy after menopause.  She quit smoking greater than 30 years ago.  She has no family history of breast or ovarian cancer but she has a maternal grandmother who had colon cancer at age 90 and a paternal cousin had colon cancer at age 67.  A maternal aunt had uterine cancer at age 75.  The patient herself was diagnosed with Hodgkin's lymphoma after a left cervical lymph node biopsy in .  She underwent mantle radiation and has been followed by Dr. Hung.  She also has a history of some hypertension and hypercholesterolemia.  She has been getting routine mammography and ultrasound but had not yet been getting screening yearly MRIs. She underwent a bilateral mammography and ultrasound on 2023 which showed a new nodule in the left breast 12:00 region on mammography which corresponded to a 2.5 cm cystic density on ultrasound.  She then underwent a screening bilateral breast MRI at [facility] on 2023 showing some enhancement in the left breast 12:00 region 5 cm from the nipple with a 6 mm circumscribed density corresponding to the mammographic finding.  A tomosynthesis guided left breast 12:00 subareolar stereotactic core biopsy was performed on 2023 at [facility] and final pathology just showed benign conglomerate of apocrine cysts.  She underwent her last bilateral mammography and ultrasound which was reviewed from today on 2024 and performed at [facility] which showed no suspicious findings. On exam today, I cannot feel any suspicious densities in either breast.  The patient was reassured and should continue routine 6-month clinical exams but I can see her yearly as long as she is getting her gynecologist or Dr. Hung to stagger her exams to get 6-month follow-up..  Her next bilateral MRI was due again in 2024 however she would like to delay it out till 2025 and I have no problem with this and she was given a prescription.  She should continue her yearly mammography and ultrasounds in 2025.  She will follow-up with Dr. Hung as well for routine follow-up given her history of the Hodgkin's lymphoma and mantle radiation.

## 2024-08-01 NOTE — PHYSICAL EXAM
[Normocephalic] : normocephalic [Atraumatic] : atraumatic [EOMI] : extra ocular movement intact [Supple] : supple [No Supraclavicular Adenopathy] : no supraclavicular adenopathy [No Cervical Adenopathy] : no cervical adenopathy [Examined in the supine and seated position] : examined in the supine and seated position [No dominant masses] : no dominant masses in right breast  [No dominant masses] : no dominant masses left breast [No Nipple Retraction] : no left nipple retraction [No Nipple Discharge] : no left nipple discharge [Breast Mass Right Breast ___cm] : no masses [Breast Mass Left Breast ___cm] : no masses [No Axillary Lymphadenopathy] : no left axillary lymphadenopathy [No Edema] : no edema [No Rashes] : no rashes [No Ulceration] : no ulceration [Breast Nipple Inversion] : nipples not inverted [Breast Nipple Retraction] : nipples not retracted [Breast Nipple Flattening] : nipples not flattened [Breast Nipple Fissures] : nipples not fissured [Breast Abnormal Lactation (Galactorrhea)] : no galactorrhea [Breast Abnormal Secretion Bloody Fluid] : no bloody discharge [Breast Abnormal Secretion Serous Fluid] : no serous discharge [Breast Abnormal Secretion Opalescent Fluid] : no milky discharge [de-identified] : On exam, the patient has moderately ptotic C-cup breasts.  On palpation, I cannot feel any suspicious densities in either breast even with close attention to the left breast 12:00 region.  She has no axillary, supraclavicular, or cervical adenopathy.

## 2024-08-01 NOTE — ASSESSMENT
[FreeTextEntry1] : The patient is a 63-year-old  postmenopausal white female of [place] descent.  She underwent menarche at age 15 and had her first child at age 37.  She never took any hormone replacement therapy after menopause.  She quit smoking greater than 30 years ago.  She has no family history of breast or ovarian cancer but she has a maternal grandmother who had colon cancer at age 90 and a paternal cousin had colon cancer at age 67.  A maternal aunt had uterine cancer at age 75.  The patient herself was diagnosed with Hodgkin's lymphoma after a left cervical lymph node biopsy in .  She underwent mantle radiation and has been followed by Dr. Hung.  She also has a history of some hypertension and hypercholesterolemia.  She has been getting routine mammography and ultrasound but had not yet been getting screening yearly MRIs. She underwent a bilateral mammography and ultrasound on 2023 which showed a new nodule in the left breast 12:00 region on mammography which corresponded to a 2.5 cm cystic density on ultrasound.  She then underwent a screening bilateral breast MRI at [facility] on 2023 showing some enhancement in the left breast 12:00 region 5 cm from the nipple with a 6 mm circumscribed density corresponding to the mammographic finding.  A tomosynthesis guided left breast 12:00 subareolar stereotactic core biopsy was performed on 2023 at [facility] and final pathology just showed benign conglomerate of apocrine cysts.  She underwent her last bilateral mammography and ultrasound which was reviewed from today on 2024 and performed at [facility] which showed no suspicious findings. On exam today, I cannot feel any suspicious densities in either breast.  The patient was reassured and should continue routine 6-month clinical exams but I can see her yearly as long as she is getting her gynecologist or Dr. Hung to stagger her exams to get 6-month follow-up..  Her next bilateral MRI was due again in 2024 however she would like to delay it out till 2025 and I have no problem with this and she was given a prescription.  She should continue her yearly mammography and ultrasounds in 2025.  She will follow-up with Dr. Hung as well for routine follow-up given her history of the Hodgkin's lymphoma and mantle radiation.

## 2024-08-01 NOTE — REASON FOR VISIT
[Follow-Up: _____] : a [unfilled] follow-up visit [FreeTextEntry1] : The patient is a postmenopausal white female of [place] descent with a family history of colon cancer and a personal history of Hodgkin's lymphoma diagnosed in 1993 for which she underwent mantle radiation and has been followed by Dr. Hung.  She comes in now for routine high risk breast cancer screening and surveillance with a new left breast 12:00 cystic nodule which was seen on mammography and ultrasound in June 2023.  MRI performed in October 2023 did show some enhancement in the left breast 12:00 region 5 cm from the nipple correlating with the mammographic mass and tomosynthesis guided needle core biopsy was performed at [facility] on November 20, 2023 just showing benign apocrine cysts.  She comes in now for routine follow-up to get close breast cancer screening surveillance due to her history of Hodgkin's lymphoma and mantle radiation.

## 2024-08-01 NOTE — PHYSICAL EXAM
[Normocephalic] : normocephalic [Atraumatic] : atraumatic [EOMI] : extra ocular movement intact [Supple] : supple [No Supraclavicular Adenopathy] : no supraclavicular adenopathy [No Cervical Adenopathy] : no cervical adenopathy [Examined in the supine and seated position] : examined in the supine and seated position [No dominant masses] : no dominant masses in right breast  [No dominant masses] : no dominant masses left breast [No Nipple Retraction] : no right nipple retraction [No Nipple Discharge] : no left nipple discharge [Breast Mass Right Breast ___cm] : no masses [Breast Mass Left Breast ___cm] : no masses [No Axillary Lymphadenopathy] : no left axillary lymphadenopathy [No Edema] : no edema [No Rashes] : no rashes [No Ulceration] : no ulceration [Breast Nipple Inversion] : nipples not inverted [Breast Nipple Retraction] : nipples not retracted [Breast Nipple Flattening] : nipples not flattened [Breast Nipple Fissures] : nipples not fissured [Breast Abnormal Lactation (Galactorrhea)] : no galactorrhea [Breast Abnormal Secretion Bloody Fluid] : no bloody discharge [Breast Abnormal Secretion Serous Fluid] : no serous discharge [Breast Abnormal Secretion Opalescent Fluid] : no milky discharge [de-identified] : On exam, the patient has moderately ptotic C-cup breasts.  On palpation, I cannot feel any suspicious densities in either breast even with close attention to the left breast 12:00 region.  She has no axillary, supraclavicular, or cervical adenopathy.

## 2024-08-01 NOTE — REASON FOR VISIT
[Follow-Up: _____] : a [unfilled] follow-up visit [FreeTextEntry1] : The patient is a postmenopausal white female of [place] descent with a family history of colon cancer and a personal history of Hodgkin's lymphoma diagnosed in 1993 for which she underwent mantle radiation and has been followed by Dr. Hung.  She comes in now for routine high risk breast cancer screening and surveillance with a new left breast 12:00 cystic nodule which was seen on mammography and ultrasound in June 2023.  MRI performed in October 2023 did show some enhancement in the left breast 12:00 region 5 cm from the nipple correlating with the mammographic mass and tomosynthesis guided needle core biopsy was performed at [facility] on November 20, 2023 just showing benign apocrine cysts.  She comes in now for routine follow-up to get close breast cancer screening surveillance due to her history of Hodgkin's lymphoma and mantle radiation.

## 2024-08-01 NOTE — HISTORY OF PRESENT ILLNESS
[FreeTextEntry1] : The patient is a 63-year-old  postmenopausal white female of [place] descent.  She underwent menarche at age 15 and had her first child at age 37.  She never took any hormone replacement therapy after menopause.  She quit smoking greater than 30 years ago.  She has no family history of breast or ovarian cancer but she has a maternal grandmother who had colon cancer at age 90 and a paternal cousin had colon cancer at age 67.  A maternal aunt had uterine cancer at age 75.  The patient herself was diagnosed with Hodgkin's lymphoma after a left cervical lymph node biopsy in .  She underwent mantle radiation and has been followed by Dr. Hung.  She also has a history of some hypertension and hypercholesterolemia.  She has been getting routine mammography and ultrasound but had not yet been getting screening yearly MRIs.  She underwent a bilateral mammography and ultrasound on 2023 which showed a new nodule in the left breast 12:00 region on mammography which corresponded to a 2.5 cm cystic density on ultrasound.  She then underwent a screening bilateral breast MRI at [facility] on 2023 showing some enhancement in the left breast 12:00 region 5 cm from the nipple with a 6 mm circumscribed density corresponding to the mammographic finding.  A tomosynthesis guided left breast 12:00 subareolar stereotactic core biopsy was performed on 2023 at [facility] and final pathology just showed a benign conglomerate of apocrine cysts.  She comes in now to continue routine close breast cancer screening/surveillance due to her history of Hodgkin's lymphoma and mantle radiation.

## 2024-08-01 NOTE — HISTORY OF PRESENT ILLNESS
[FreeTextEntry1] : The patient is a 63-year-old  postmenopausal white female of [place] descent.  She underwent menarche at age 15 and had her first child at age 37.  She never took any hormone replacement therapy after menopause.  She quit smoking greater than 30 years ago.  She has no family history of breast or ovarian cancer but she has a maternal grandmother who had colon cancer at age 90 and a paternal cousin had colon cancer at age 67.  A maternal aunt had uterine cancer at age 75.  The patient herself was diagnosed with Hodgkin's lymphoma after a left cervical lymph node biopsy in .  She underwent mantle radiation and has been followed by Dr. Hung.  She also has a history of some hypertension and hypercholesterolemia.  She has been getting routine mammography and ultrasound but had not yet been getting screening yearly MRIs.  She underwent a bilateral mammography and ultrasound on 2023 which showed a new nodule in the left breast 12:00 region on mammography which corresponded to a 2.5 cm cystic density on ultrasound.  She then underwent a screening bilateral breast MRI at [facility] on 2023 showing some enhancement in the left breast 12:00 region 5 cm from the nipple with a 6 mm circumscribed density corresponding to the mammographic finding.  A tomosynthesis guided left breast 12:00 subareolar stereotactic core biopsy was performed on 2023 at [facility] and final pathology just showed a benign conglomerate of apocrine cysts.  She comes in now to continue routine close breast cancer screening/surveillance due to her history of Hodgkin's lymphoma and mantle radiation.

## 2024-08-01 NOTE — ASSESSMENT
[FreeTextEntry1] : The patient is a 63-year-old  postmenopausal white female of [place] descent.  She underwent menarche at age 15 and had her first child at age 37.  She never took any hormone replacement therapy after menopause.  She quit smoking greater than 30 years ago.  She has no family history of breast or ovarian cancer but she has a maternal grandmother who had colon cancer at age 90 and a paternal cousin had colon cancer at age 67.  A maternal aunt had uterine cancer at age 75.  The patient herself was diagnosed with Hodgkin's lymphoma after a left cervical lymph node biopsy in .  She underwent mantle radiation and has been followed by Dr. Hung.  She also has a history of some hypertension and hypercholesterolemia.  She has been getting routine mammography and ultrasound but had not yet been getting screening yearly MRIs. She underwent a bilateral mammography and ultrasound on 2023 which showed a new nodule in the left breast 12:00 region on mammography which corresponded to a 2.5 cm cystic density on ultrasound.  She then underwent a screening bilateral breast MRI at [facility] on 2023 showing some enhancement in the left breast 12:00 region 5 cm from the nipple with a 6 mm circumscribed density corresponding to the mammographic finding.  A tomosynthesis guided left breast 12:00 subareolar stereotactic core biopsy was performed on 2023 at [facility] and final pathology just showed benign conglomerate of apocrine cysts.  She underwent her last bilateral mammography and ultrasound which was reviewed from today on 2024 and performed at [facility] which showed no suspicious findings. On exam today, I cannot feel any suspicious densities in either breast.  The patient was reassured and should continue routine 6-month clinical exams but I can see her yearly as long as she is getting her gynecologist or Dr. Hung to stagger her exams to get 6-month follow-up..  Her next bilateral MRI was due again in 2024 however she would like to delay it out till 2025 and I have no problem with this and she was given a prescription.  She should continue her yearly mammography and ultrasounds in 2025.  She will follow-up with Dr. Hung as well for routine follow-up given her history of the Hodgkin's lymphoma and mantle radiation.

## 2024-11-07 ENCOUNTER — LABORATORY RESULT (OUTPATIENT)
Age: 64
End: 2024-11-07

## 2024-11-07 ENCOUNTER — APPOINTMENT (OUTPATIENT)
Dept: HEART AND VASCULAR | Facility: CLINIC | Age: 64
End: 2024-11-07

## 2024-11-07 ENCOUNTER — NON-APPOINTMENT (OUTPATIENT)
Age: 64
End: 2024-11-07

## 2024-11-07 VITALS
WEIGHT: 161 LBS | OXYGEN SATURATION: 98 % | BODY MASS INDEX: 29.63 KG/M2 | HEART RATE: 81 BPM | DIASTOLIC BLOOD PRESSURE: 68 MMHG | SYSTOLIC BLOOD PRESSURE: 122 MMHG | RESPIRATION RATE: 16 BRPM | HEIGHT: 62 IN

## 2024-11-07 PROCEDURE — 93244 EXT ECG>48HR<7D REV&INTERPJ: CPT

## 2024-11-07 PROCEDURE — 93246 EXT ECG>7D<15D RECORDING: CPT

## 2024-11-07 PROCEDURE — 93000 ELECTROCARDIOGRAM COMPLETE: CPT | Mod: 59

## 2024-11-07 PROCEDURE — 36415 COLL VENOUS BLD VENIPUNCTURE: CPT

## 2024-11-07 PROCEDURE — 99214 OFFICE O/P EST MOD 30 MIN: CPT | Mod: 25

## 2024-11-10 LAB
ALBUMIN SERPL ELPH-MCNC: 4.7 G/DL
ALP BLD-CCNC: 82 U/L
ALT SERPL-CCNC: 23 U/L
ANION GAP SERPL CALC-SCNC: 12 MMOL/L
AST SERPL-CCNC: 27 U/L
BASOPHILS # BLD AUTO: 0.09 K/UL
BASOPHILS NFR BLD AUTO: 0.9 %
BILIRUB SERPL-MCNC: 0.4 MG/DL
BUN SERPL-MCNC: 15 MG/DL
CALCIUM SERPL-MCNC: 10.2 MG/DL
CHLORIDE SERPL-SCNC: 103 MMOL/L
CHOLEST SERPL-MCNC: 160 MG/DL
CK SERPL-CCNC: 70 U/L
CO2 SERPL-SCNC: 26 MMOL/L
CREAT SERPL-MCNC: 0.84 MG/DL
CRP SERPL HS-MCNC: 1.23 MG/L
EGFR: 79 ML/MIN/1.73M2
EOSINOPHIL # BLD AUTO: 0.08 K/UL
EOSINOPHIL NFR BLD AUTO: 0.8 %
ESTIMATED AVERAGE GLUCOSE: 131 MG/DL
FERRITIN SERPL-MCNC: 88 NG/ML
FOLATE SERPL-MCNC: 14.1 NG/ML
GLUCOSE SERPL-MCNC: 86 MG/DL
HBA1C MFR BLD HPLC: 6.2 %
HCT VFR BLD CALC: 45.9 %
HDLC SERPL-MCNC: 55 MG/DL
HGB BLD-MCNC: 13.7 G/DL
IMM GRANULOCYTES NFR BLD AUTO: 0.2 %
IRON SATN MFR SERPL: 20 %
IRON SERPL-MCNC: 81 UG/DL
LDLC SERPL CALC-MCNC: 91 MG/DL
LYMPHOCYTES # BLD AUTO: 3.43 K/UL
LYMPHOCYTES NFR BLD AUTO: 33.2 %
MAGNESIUM SERPL-MCNC: 2 MG/DL
MAN DIFF?: NORMAL
MCHC RBC-ENTMCNC: 21.1 PG
MCHC RBC-ENTMCNC: 29.8 G/DL
MCV RBC AUTO: 70.7 FL
MONOCYTES # BLD AUTO: 0.87 K/UL
MONOCYTES NFR BLD AUTO: 8.4 %
NEUTROPHILS # BLD AUTO: 5.85 K/UL
NEUTROPHILS NFR BLD AUTO: 56.5 %
NONHDLC SERPL-MCNC: 105 MG/DL
PLATELET # BLD AUTO: 309 K/UL
POTASSIUM SERPL-SCNC: 5 MMOL/L
PROT SERPL-MCNC: 6.9 G/DL
RBC # BLD: 6.49 M/UL
RBC # FLD: 18.5 %
SODIUM SERPL-SCNC: 141 MMOL/L
T3FREE SERPL-MCNC: 2.84 PG/ML
T3RU NFR SERPL: 1.2 TBI
T4 SERPL-MCNC: 6.8 UG/DL
TIBC SERPL-MCNC: 398 UG/DL
TRIGL SERPL-MCNC: 71 MG/DL
TSH SERPL-ACNC: 3.28 UIU/ML
UIBC SERPL-MCNC: 317 UG/DL
VIT B12 SERPL-MCNC: 484 PG/ML
WBC # FLD AUTO: 10.34 K/UL

## 2024-11-11 ENCOUNTER — NON-APPOINTMENT (OUTPATIENT)
Age: 64
End: 2024-11-11

## 2024-12-10 ENCOUNTER — NON-APPOINTMENT (OUTPATIENT)
Age: 64
End: 2024-12-10

## 2024-12-19 ENCOUNTER — TRANSCRIPTION ENCOUNTER (OUTPATIENT)
Age: 64
End: 2024-12-19

## 2025-02-12 ENCOUNTER — APPOINTMENT (OUTPATIENT)
Dept: HEART AND VASCULAR | Facility: CLINIC | Age: 65
End: 2025-02-12
Payer: COMMERCIAL

## 2025-02-12 PROCEDURE — 93325 DOPPLER ECHO COLOR FLOW MAPG: CPT

## 2025-02-12 PROCEDURE — 93351 STRESS TTE COMPLETE: CPT

## 2025-02-12 PROCEDURE — 93320 DOPPLER ECHO COMPLETE: CPT

## 2025-02-18 ENCOUNTER — RESULT REVIEW (OUTPATIENT)
Age: 65
End: 2025-02-18

## 2025-02-20 ENCOUNTER — NON-APPOINTMENT (OUTPATIENT)
Age: 65
End: 2025-02-20

## 2025-03-20 ENCOUNTER — RX RENEWAL (OUTPATIENT)
Age: 65
End: 2025-03-20

## 2025-06-24 ENCOUNTER — NON-APPOINTMENT (OUTPATIENT)
Age: 65
End: 2025-06-24

## 2025-08-06 ENCOUNTER — RESULT REVIEW (OUTPATIENT)
Age: 65
End: 2025-08-06

## 2025-08-13 ENCOUNTER — APPOINTMENT (OUTPATIENT)
Dept: BREAST CENTER | Facility: CLINIC | Age: 65
End: 2025-08-13
Payer: COMMERCIAL

## 2025-08-13 ENCOUNTER — NON-APPOINTMENT (OUTPATIENT)
Age: 65
End: 2025-08-13

## 2025-08-13 VITALS
BODY MASS INDEX: 29.63 KG/M2 | HEIGHT: 62 IN | DIASTOLIC BLOOD PRESSURE: 73 MMHG | HEART RATE: 90 BPM | SYSTOLIC BLOOD PRESSURE: 135 MMHG | OXYGEN SATURATION: 99 % | WEIGHT: 161 LBS

## 2025-08-13 DIAGNOSIS — N60.11 DIFFUSE CYSTIC MASTOPATHY OF RIGHT BREAST: ICD-10-CM

## 2025-08-13 DIAGNOSIS — Z85.71 PERSONAL HISTORY OF HODGKIN LYMPHOMA: ICD-10-CM

## 2025-08-13 DIAGNOSIS — N60.12 DIFFUSE CYSTIC MASTOPATHY OF RIGHT BREAST: ICD-10-CM

## 2025-08-13 DIAGNOSIS — Z12.39 ENCOUNTER FOR OTHER SCREENING FOR MALIGNANT NEOPLASM OF BREAST: ICD-10-CM

## 2025-08-13 PROCEDURE — 99213 OFFICE O/P EST LOW 20 MIN: CPT

## 2025-08-14 ENCOUNTER — TRANSCRIPTION ENCOUNTER (OUTPATIENT)
Age: 65
End: 2025-08-14